# Patient Record
Sex: FEMALE
[De-identification: names, ages, dates, MRNs, and addresses within clinical notes are randomized per-mention and may not be internally consistent; named-entity substitution may affect disease eponyms.]

---

## 2023-05-15 PROBLEM — Z00.00 ENCOUNTER FOR PREVENTIVE HEALTH EXAMINATION: Status: ACTIVE | Noted: 2023-05-15

## 2023-05-15 NOTE — REASON FOR VISIT
[Initial Evaluation] : an initial evaluation [FreeTextEntry1] : The patient is a postmenopausal female of -American descent with a strong family history of breast cancer.  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  She had 2 negative nodes and the cancer was ER positive [place] positive and HER2/christiana negative.  She underwent CMF chemotherapy through Dr. Sandoval and IMRT radiation through [address] [place] [address] radiation oncology.  She took Arimidex for 5 years.  She has been lost to follow-up and comes in now to reestablish follow-up.

## 2023-05-15 NOTE — HISTORY OF PRESENT ILLNESS
[FreeTextEntry1] : The patient is a 57-year-old nulliparous postmenopausal female of -American descent.  She underwent menopause after a ALONSO/BSO performed for fibroids in 2006.  She has a family history with her mother who had breast cancer and is a patient of ours.  Her maternal grandmother also had breast cancer.  The patient was diagnosed with a left breast lower outer quadrant breast cancer in January 2007 and underwent a left breast partial mastectomy and sentinel lymph node biopsy on March 14, 2007 and had a low-grade 1.5 cm infiltrating ductal carcinoma with 2 negative sentinel lymph nodes with no lymphovascular invasion and free margins.  The cancer was ER positive at 90%, FL positive at 30%, and HER2/christiana negative.  She did undergo genetic testing which was BRCA negative.  She had CMF chemotherapy through Dr. Sandoval in [place] and then underwent IMRT radiation at [address] [place] [address] radiation oncology.  She was on Arimidex for 5 years and stopped.  She has been lost to follow-up for a few years and comes in now to reestablish care

## 2023-05-15 NOTE — PHYSICAL EXAM
[Normocephalic] : normocephalic [Atraumatic] : atraumatic [EOMI] : extra ocular movement intact [Supple] : supple [No Supraclavicular Adenopathy] : no supraclavicular adenopathy [No Cervical Adenopathy] : no cervical adenopathy [Examined in the supine and seated position] : examined in the supine and seated position [No dominant masses] : no dominant masses in right breast  [No dominant masses] : no dominant masses left breast [No Nipple Retraction] : no left nipple retraction [No Nipple Discharge] : no left nipple discharge [Breast Mass Right Breast ___cm] : no masses [Breast Mass Left Breast ___cm] : no masses [Breast Nipple Inversion] : nipples not inverted [Breast Nipple Retraction] : nipples not retracted [Breast Nipple Flattening] : nipples not flattened [Breast Nipple Fissures] : nipples not fissured [Breast Abnormal Lactation (Galactorrhea)] : no galactorrhea [Breast Abnormal Secretion Bloody Fluid] : no bloody discharge [Breast Abnormal Secretion Serous Fluid] : no serous discharge [Breast Abnormal Secretion Opalescent Fluid] : no milky discharge [No Axillary Lymphadenopathy] : no left axillary lymphadenopathy [No Edema] : no edema [No Rashes] : no rashes [No Ulceration] : no ulceration [de-identified] : On exam, the patient's breast is smaller on the left compared to her right.  On palpation she has the obvious wide excision on the lower outer aspect of the left breast with a small area of retraction related to her surgery.  She has typical scarring changes underneath the wide excision site in the lower outer aspect of the left breast but no evidence of recurrence and no suspicious masses.  She has no axillary, supraclavicular, or cervical adenopathy. [de-identified] : Status post lower outer quadrant partial mastectomy with some volume loss and scarring but no evidence of recurrence. [de-identified] : Lower outer quadrant scarring changes from previous partial mastectomy

## 2023-05-15 NOTE — PAST MEDICAL HISTORY
[Postmenopausal] : The patient is postmenopausal [Menopause Age____] : age at menopause was [unfilled] [Total Preg ___] : G[unfilled] [Live Births ___] : P[unfilled]  [de-identified] : s/p ALONSO/BSO for fibroids in 2006

## 2023-05-22 ENCOUNTER — APPOINTMENT (OUTPATIENT)
Dept: BREAST CENTER | Facility: CLINIC | Age: 58
End: 2023-05-22

## 2023-06-16 NOTE — ASSESSMENT
[FreeTextEntry1] : The patient is a 57-year-old nulliparous postmenopausal female of -American descent.  She underwent menopause after a ALONSO/BSO performed for fibroids in 2006.  She has a family history with her mother who had breast cancer and is a patient of ours.  Her maternal grandmother also had breast cancer.  The patient was diagnosed with a left breast lower outer quadrant breast cancer in January 2007 and underwent a left breast partial mastectomy and sentinel lymph node biopsy on March 14, 2007 and had a low-grade 1.5 cm infiltrating ductal carcinoma with 2 negative sentinel lymph nodes with no lymphovascular invasion and free margins.  The cancer was ER positive at 90%, NE positive at 30%, and HER2/christiana negative.  She did undergo genetic testing which was BRCA negative.  She had CMF chemotherapy through Dr. Sandoval in [place] and then underwent IMRT radiation at [address] [place] [address] radiation oncology.  She was on Arimidex for 5 years and stopped.  The patient underwent her last bilateral mammography and ultrasound which was reviewed from ????????. On exam today, ..... The patient was reassured and should continue yearly follow-up again in May 2024.  Next bilateral mammography and ultrasound will be due in ??????? and she was given prescriptions. Bilateral Helical Rim Advancement Flap Text: The defect edges were debeveled with a #15 blade scalpel.  Given the location of the defect and the proximity to free margins (helical rim) a bilateral helical rim advancement flap was deemed most appropriate.  Using a sterile surgical marker, the appropriate advancement flaps were drawn incorporating the defect and placing the expected incisions between the helical rim and antihelix where possible.  The area thus outlined was incised through and through with a #15 scalpel blade.  With a skin hook and iris scissors, the flaps were gently and sharply undermined and freed up.